# Patient Record
Sex: MALE | Race: WHITE | NOT HISPANIC OR LATINO
[De-identification: names, ages, dates, MRNs, and addresses within clinical notes are randomized per-mention and may not be internally consistent; named-entity substitution may affect disease eponyms.]

---

## 2020-07-29 PROBLEM — Z00.00 ENCOUNTER FOR PREVENTIVE HEALTH EXAMINATION: Status: ACTIVE | Noted: 2020-07-29

## 2023-09-01 ENCOUNTER — APPOINTMENT (OUTPATIENT)
Dept: ORTHOPEDIC SURGERY | Facility: CLINIC | Age: 63
End: 2023-09-01
Payer: COMMERCIAL

## 2023-09-01 PROCEDURE — 99205 OFFICE O/P NEW HI 60 MIN: CPT

## 2023-09-01 PROCEDURE — 72170 X-RAY EXAM OF PELVIS: CPT

## 2023-09-01 PROCEDURE — 72110 X-RAY EXAM L-2 SPINE 4/>VWS: CPT

## 2023-09-01 RX ORDER — NEBIVOLOL HYDROCHLORIDE 20 MG/1
TABLET ORAL
Refills: 0 | Status: ACTIVE | COMMUNITY

## 2023-09-01 RX ORDER — ALLOPURINOL 300 MG/1
300 TABLET ORAL
Refills: 0 | Status: ACTIVE | COMMUNITY

## 2023-09-01 RX ORDER — IBUPROFEN 100 MG/1
TABLET, CHEWABLE ORAL
Refills: 0 | Status: ACTIVE | COMMUNITY

## 2023-09-01 RX ORDER — ATORVASTATIN CALCIUM 40 MG/1
40 TABLET, FILM COATED ORAL
Refills: 0 | Status: ACTIVE | COMMUNITY

## 2023-09-01 NOTE — IMAGING
[de-identified] : LSPINE Inspection: No rash or ecchymosis Palpation: No tenderness to palpation or spasm in bilateral thoracic and lumbar paraspinal musculature, no SI joint tenderness to palpation ROM: Full with no pain Strength: 5/5 bilateral hip flexors, knee extensors, ankle dorsiflexors, EHL, ankle plantarflexors Sensation: Sensation present to light touch bilateral L2-S1 distributions Provocative maneuvers: Negative bilateral straight leg raise

## 2023-09-01 NOTE — ASSESSMENT
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   C/w OTC meds F/up PRN

## 2023-09-01 NOTE — HISTORY OF PRESENT ILLNESS
[Lower back] : lower back [8] : 8 [Dull/Aching] : dull/aching [Localized] : localized [Constant] : constant [Sleep] : sleep [Nothing helps with pain getting better] : Nothing helps with pain getting better [de-identified] : 11/9/21  Lumbar MRI  - report noted in chart.  At T12-L1, the canal and foramen are clear. At L1-2, the canal and foramen are clear. At L2-3, the disc is greatly reduced in height and bulges causing a ventral epidural defect on the thecal sac. The configuration of the disc is relatively focal on the left side of the ventral epidural space which could indicate the presence of a superimposed left-sided herniation. Regardless, the configuration of the facets plus the disc cause high-grade bilateral lateral recess stenosis, left side more than right, and compression of the descending left L3 nerve root as the L3 root traverses this level. The foramen are clear. At L3-4, the disc is slightly reduced in height and bulges. The bulge is asymmetrically larger on the right side and causes a ventral epidural defect on the right side of the thecal sac. Furthermore, the contour of the disc in the right side of the ventral epidural space is slightly focal in configuration which suggests the presence of an underlying small herniation. Regardless, hypertrophic degenerative facet arthritis is present bilaterally and the configuration of the disc plus the configuration of the facet may be compressing the descending right L4 nerve root in the lateral recess as the L4 root traverses this level. The foramen are clear. At L4-5, the height of the disc is relatively well maintained. However, abnormal signal is present in the annulus fibrosis in the midline and just to the right of midline in the ventral epidural space consistent with an annular tear. Associated with the tear is a slight projection of the disc into the ventral epidural space that could represent a small central herniation just to the right of midline. The contour of the disc causes a minor ventral epidural defect on the thecal sac just to the right of midline. The foramen are clear. At L5-S1, the height of the disc is maintained. The canal and foramen are clear. Hypertrophic degenerative facet arthritis on the left side is present. Ind. 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 No radiating pain. Has had 3 ESIs w/ minimal relief 1 day; PT; yoga- none has helped. MRI L Spine completed @ [facility] 2021. Diffuse back pain. Denies N/T in LE. No bb dysfunction. Had an intracept on 03/24/23 with some temporarily relief. Pain exacerbates when bending forward.  [] : no

## 2023-09-26 ENCOUNTER — APPOINTMENT (OUTPATIENT)
Dept: PAIN MANAGEMENT | Facility: CLINIC | Age: 63
End: 2023-09-26
Payer: COMMERCIAL

## 2023-09-26 VITALS — HEIGHT: 71 IN | BODY MASS INDEX: 42.7 KG/M2 | WEIGHT: 305 LBS

## 2023-09-26 DIAGNOSIS — Z86.39 PERSONAL HISTORY OF OTHER ENDOCRINE, NUTRITIONAL AND METABOLIC DISEASE: ICD-10-CM

## 2023-09-26 DIAGNOSIS — Z86.79 PERSONAL HISTORY OF OTHER DISEASES OF THE CIRCULATORY SYSTEM: ICD-10-CM

## 2023-09-26 PROCEDURE — 99214 OFFICE O/P EST MOD 30 MIN: CPT

## 2023-09-27 ENCOUNTER — TRANSCRIPTION ENCOUNTER (OUTPATIENT)
Age: 63
End: 2023-09-27

## 2023-10-02 ENCOUNTER — APPOINTMENT (OUTPATIENT)
Dept: PAIN MANAGEMENT | Facility: CLINIC | Age: 63
End: 2023-10-02
Payer: COMMERCIAL

## 2023-10-02 PROCEDURE — J3490M: CUSTOM

## 2023-10-02 PROCEDURE — 64493 INJ PARAVERT F JNT L/S 1 LEV: CPT | Mod: 50

## 2023-10-02 PROCEDURE — 64494 INJ PARAVERT F JNT L/S 2 LEV: CPT | Mod: 50,59

## 2023-10-10 ENCOUNTER — APPOINTMENT (OUTPATIENT)
Dept: PAIN MANAGEMENT | Facility: CLINIC | Age: 63
End: 2023-10-10
Payer: COMMERCIAL

## 2023-10-10 VITALS — BODY MASS INDEX: 42.7 KG/M2 | WEIGHT: 305 LBS | HEIGHT: 71 IN

## 2023-10-10 PROCEDURE — 99214 OFFICE O/P EST MOD 30 MIN: CPT

## 2023-10-17 ENCOUNTER — APPOINTMENT (OUTPATIENT)
Dept: PAIN MANAGEMENT | Facility: CLINIC | Age: 63
End: 2023-10-17
Payer: COMMERCIAL

## 2023-10-17 PROCEDURE — J3490M: CUSTOM

## 2023-10-17 PROCEDURE — 64494 INJ PARAVERT F JNT L/S 2 LEV: CPT | Mod: 50,59

## 2023-10-17 PROCEDURE — 64493 INJ PARAVERT F JNT L/S 1 LEV: CPT | Mod: 50

## 2023-10-31 ENCOUNTER — APPOINTMENT (OUTPATIENT)
Dept: PAIN MANAGEMENT | Facility: CLINIC | Age: 63
End: 2023-10-31
Payer: COMMERCIAL

## 2023-10-31 VITALS — HEIGHT: 71 IN | BODY MASS INDEX: 42.7 KG/M2 | WEIGHT: 305 LBS

## 2023-10-31 PROCEDURE — 99214 OFFICE O/P EST MOD 30 MIN: CPT

## 2023-11-14 ENCOUNTER — APPOINTMENT (OUTPATIENT)
Dept: PAIN MANAGEMENT | Facility: CLINIC | Age: 63
End: 2023-11-14
Payer: COMMERCIAL

## 2023-11-14 PROCEDURE — J3490M: CUSTOM

## 2023-11-14 PROCEDURE — 64636Z: CUSTOM | Mod: 50,59

## 2023-11-14 PROCEDURE — 64635 DESTROY LUMB/SAC FACET JNT: CPT | Mod: 50

## 2023-11-28 ENCOUNTER — APPOINTMENT (OUTPATIENT)
Dept: PAIN MANAGEMENT | Facility: CLINIC | Age: 63
End: 2023-11-28
Payer: COMMERCIAL

## 2023-11-28 VITALS — HEIGHT: 71 IN | WEIGHT: 305 LBS | BODY MASS INDEX: 42.7 KG/M2

## 2023-11-28 PROCEDURE — 99213 OFFICE O/P EST LOW 20 MIN: CPT

## 2023-12-22 ENCOUNTER — TRANSCRIPTION ENCOUNTER (OUTPATIENT)
Age: 63
End: 2023-12-22

## 2023-12-26 ENCOUNTER — RX RENEWAL (OUTPATIENT)
Age: 63
End: 2023-12-26

## 2023-12-26 RX ORDER — DICLOFENAC SODIUM 75 MG/1
75 TABLET, DELAYED RELEASE ORAL
Qty: 60 | Refills: 0 | Status: ACTIVE | COMMUNITY
Start: 2023-11-28 | End: 1900-01-01

## 2024-03-19 ENCOUNTER — APPOINTMENT (OUTPATIENT)
Dept: PAIN MANAGEMENT | Facility: CLINIC | Age: 64
End: 2024-03-19
Payer: COMMERCIAL

## 2024-03-19 VITALS — WEIGHT: 295 LBS | HEIGHT: 71 IN | BODY MASS INDEX: 41.3 KG/M2

## 2024-03-19 PROCEDURE — 99214 OFFICE O/P EST MOD 30 MIN: CPT

## 2024-03-19 RX ORDER — DICLOFENAC SODIUM 75 MG/1
75 TABLET, DELAYED RELEASE ORAL
Qty: 60 | Refills: 2 | Status: ACTIVE | COMMUNITY
Start: 2023-12-26 | End: 1900-01-01

## 2024-03-19 NOTE — PHYSICAL EXAM
[Extension] : extension [de-identified] : Constitutional; Appears well, no apparent distress Ability to communicate: Normal  Respiratory: non-labored breathing Skin: No rash noted Head: Normocephalic, atraumatic Neck: no visible thyroid enlargement Eyes: Extraocular movements intact Neurologic: Alert and oriented x3 Psychiatric: normal mood, affect and behavior [] : non-antalgic

## 2024-03-19 NOTE — REVIEW OF SYSTEMS
You may receive a survey regarding the care you received during your visit. Your input is valuable to us. We encourage you to complete and return your survey. We hope you will choose us in the future for your healthcare needs. [Negative] : Heme/Lymph

## 2024-03-19 NOTE — DISCUSSION/SUMMARY
[de-identified] : After discussing various treatment options with the patient including but not limited to oral medications, physical therapy, exercise modalities as well as interventional spinal injections, we have decided with the following plan:   - Continue Home exercises, stretching, activity modification, physical therapy, and conservative care. - MRI report and/or images was reviewed and discussed with the patient. - Recommend Bilateral L3,4,5 radiofrequency ablation under fluoroscopic guidance with image. - Patient has lumbosacral axial pain that is consistent with facet joint pathology. Two diagnostic blocks of the medial branch nerves with local anesthetic was performed and has resulted in at least an 80% reduction in pain for the duration of the specific local anesthetic. The pain is not radicular. There is no prior spinal fusion surgery at the level targeted.  The pain has failed to respond to three months of conservative therapy. - The risks, benefits, contents and alternatives to injection were explained in full to the patient.  Risks outlined include but are not limited to infection, sepsis, bleeding, post-dural puncture headache, nerve damage, temporary increase in pain, syncopal episode, failure to resolve symptoms, allergic reaction, symptom recurrence, and elevation of blood sugar in diabetics. Cortisone may cause immunosuppression.  Patient understands the risks.  All questions were answered.  After discussion of options, patient requested an injection.  Information regarding the injection was given to the patient.  Which medications to stop prior to the injection was explained to the patient as well. - Follow up in 1-2 weeks post injection for re-evaluation.   - Recommend Diclofenac 75mg BID PRN. Potential adverse effects including but not limited to bleeding, ulcers, increased risk of hypertension, heart disease, kidney disease and stroke were discussed with the patient.  Medication would allow patient to be more mobile and perform ADL's.  Will continue to monitor patient and attempt to wean as soon as possible. Will use the lowest dosage possible for the shortest possible period of time. Will refill.

## 2024-03-19 NOTE — HISTORY OF PRESENT ILLNESS
[Lower back] : lower back [Mid-back] : mid-back [6] : 6 [Dull/Aching] : dull/aching [Radiating] : radiating [Sharp] : sharp [Meds] : meds [Constant] : constant [] : no [FreeTextEntry1] : b/l  [de-identified] : 2023 [de-identified] : malick Bella [de-identified] : L MRI

## 2024-04-16 ENCOUNTER — APPOINTMENT (OUTPATIENT)
Dept: PAIN MANAGEMENT | Facility: CLINIC | Age: 64
End: 2024-04-16
Payer: COMMERCIAL

## 2024-04-16 PROCEDURE — 64636Z: CUSTOM | Mod: 50,59

## 2024-04-16 PROCEDURE — J3490M: CUSTOM

## 2024-04-16 PROCEDURE — 64635 DESTROY LUMB/SAC FACET JNT: CPT | Mod: 50

## 2024-04-16 NOTE — PROCEDURE
[FreeTextEntry3] : Date of Service: 04/16/2024   Account: [id number]  Patient: AGUSTO BOYKIN   YOB: 1960  Age: 63 year   Surgeon: Kevin Remy D.O.   PREOPERATIVE DIAGNOSIS: Spondylosis of Lumbar Region without Myelopathy or Radiculopathy (M47.816)  POSTOPERATIVE DIAGNOSIS: Spondylosis of Lumbar Region without Myelopathy or Radiculopathy (M47.816)  PROCEDURE:  Right L3, L4, L5 and Left L3, L4, L5 medial branch radiofrequency ablation under fluoroscopic guidance.              Anesthesia: Local with MAC   Risks, benefits and alternatives of the procedure were discussed with the patient after which she agreed to proceed.  Patient was brought into fluoroscopy suite and was placed in prone position with hip support. Back was prepped and draped in a sterile fashion. Anesthesia initiated.   Under AP visualization, the right and left sacral ala was identified and marked. Using a 25-gauge  inch needle the skin and subcutaneous structures at this point were localized with 1% Lidocaine using approximately 3 cc's 1% Lidocaine.  After this, a 20-gauge 100mm [place] radiofrequency needle with a 10mm curved tip was inserted and using depth direction depth technique under constant fluoroscopic visualization, the needle was advanced to the sacral ala until os was contacted.    The camera was then redirected under AP view to visualize the right and left L4 and L5 vertebra. The camera was obliqued to approximately 30 degrees to reveal good Lazaro dog anatomical view. The junction of the superior articulate process and transverse process at the L4 and L5 levels were then identified and marked. Skin and subcutaneous structures were then anesthetized with approximately 3 cc's of 1% Lidocaine at each of these levels. After which a 20-gauge 100mm Leticia radiofrequency needle with a 10mm curved tip then advanced until the junction at the SAP and transverse process was met.  The camera was then directed through the lateral view and under constant fluoroscopic visualization, the needle tips were then advanced and confirmed at the junction of the SAP and transverse process.    The stylette for the most cephalad needle at the L4 level was then removed and a [place] radiofrequency probe was then placed inside the needle. After her pedis' were checked at approximately 300 ohm, 50 hertz sensory stimulation was performed.  Patient experienced concordant pain in his low back at approximately 0.3 volts. The voltage was then increased to 1 volt. The patient reported increased low back pain symptoms without any radiation below the knee.  Stimulation was then changed to 2 hertz and increased slowly by .1 volt increments at approximately 0.5 volts, patient began to experience thumping like reproductive pain in his low back. The voltage was then increased to approximately 2.5 volts. Patient experienced increasing thumping without any sensation below his knee. This exact stimulation was then repeated for the L5 needle as well as sacral ala needle with concordant pain and no radiation below the knee. The 6 levels were then anesthetized with approximately 0.5 cc's of 1% Lidocaine. After which each area was then ablated at 80 degrees centigrade for 60 seconds each. Patient felt no pain reproduction during the ablation procedure.  After each of these levels were ablated and injected approximately 1 cc of 0.25% Bupivacaine plus 1.5mg of betamethasone were then injected before the needles were removed.  Pressure was then applied to the low back. Band-aids were applied.  Patient was brought to the recovery, ambulated on his own after the procedure and reported decreased low back pain.   Anesthesia personnel were present throughout the procedure. Patient was told to apply ice to the low back for 20 minutes on and 20 minutes off for focal symptoms for 24-48 hours. He is to call the office if he had any questions or concerns.    Kevin Remy D.O.

## 2024-05-07 ENCOUNTER — APPOINTMENT (OUTPATIENT)
Dept: PAIN MANAGEMENT | Facility: CLINIC | Age: 64
End: 2024-05-07
Payer: COMMERCIAL

## 2024-05-07 VITALS — WEIGHT: 295 LBS | HEIGHT: 71 IN | BODY MASS INDEX: 41.3 KG/M2

## 2024-05-07 DIAGNOSIS — M51.36 OTHER INTERVERTEBRAL DISC DEGENERATION, LUMBAR REGION: ICD-10-CM

## 2024-05-07 DIAGNOSIS — M54.50 LOW BACK PAIN, UNSPECIFIED: ICD-10-CM

## 2024-05-07 DIAGNOSIS — M47.819 SPONDYLOSIS W/OUT MYELOPATHY OR RADICULOPATHY, SITE UNSPECIFIED: ICD-10-CM

## 2024-05-07 PROCEDURE — 99213 OFFICE O/P EST LOW 20 MIN: CPT

## 2024-05-07 NOTE — DISCUSSION/SUMMARY
[de-identified] : After discussing various treatment options with the patient including but not limited to oral medications, physical therapy, exercise modalities as well as interventional spinal injections, we have decided with the following plan:  - Continue home exercises, stretching, activity modification, physical therapy, and conservative care. - Follow-up as needed. - Recommend continue Diclofenac 75mg BID PRN. Potential adverse effects including but not limited to bleeding, ulcers, increased risk of hypertension, heart disease, kidney disease and stroke were discussed with the patient.  Medication would allow patient to be more mobile and perform ADL's.  Will continue to monitor patient and attempt to wean as soon as possible. Will use the lowest dosage possible for the shortest possible period of time. Will refill.  - Can consider medical marijuana vs acupuncture.

## 2024-05-07 NOTE — PHYSICAL EXAM
[Extension] : extension [de-identified] : Constitutional; Appears well, no apparent distress Ability to communicate: Normal  Respiratory: non-labored breathing Skin: No rash noted Head: Normocephalic, atraumatic Neck: no visible thyroid enlargement Eyes: Extraocular movements intact Neurologic: Alert and oriented x3 Psychiatric: normal mood, affect and behavior [] : non-antalgic

## 2024-07-07 ENCOUNTER — NON-APPOINTMENT (OUTPATIENT)
Age: 64
End: 2024-07-07

## 2024-07-08 ENCOUNTER — APPOINTMENT (OUTPATIENT)
Dept: ORTHOPEDIC SURGERY | Facility: CLINIC | Age: 64
End: 2024-07-08
Payer: COMMERCIAL

## 2024-07-08 VITALS — BODY MASS INDEX: 42.7 KG/M2 | HEIGHT: 71 IN | WEIGHT: 305 LBS

## 2024-07-08 DIAGNOSIS — M51.36 OTHER INTERVERTEBRAL DISC DEGENERATION, LUMBAR REGION: ICD-10-CM

## 2024-07-08 PROCEDURE — 99204 OFFICE O/P NEW MOD 45 MIN: CPT

## 2024-07-25 ENCOUNTER — APPOINTMENT (OUTPATIENT)
Dept: ORTHOPEDIC SURGERY | Facility: CLINIC | Age: 64
End: 2024-07-25
Payer: COMMERCIAL

## 2024-07-25 VITALS
BODY MASS INDEX: 42.7 KG/M2 | DIASTOLIC BLOOD PRESSURE: 82 MMHG | HEART RATE: 80 BPM | WEIGHT: 305 LBS | SYSTOLIC BLOOD PRESSURE: 132 MMHG | HEIGHT: 71 IN

## 2024-07-25 DIAGNOSIS — M51.36 OTHER INTERVERTEBRAL DISC DEGENERATION, LUMBAR REGION: ICD-10-CM

## 2024-07-25 PROCEDURE — 99214 OFFICE O/P EST MOD 30 MIN: CPT

## 2024-07-25 NOTE — DISCUSSION/SUMMARY
[Medication Risks Reviewed] : Medication risks reviewed [Surgical risks reviewed] : Surgical risks reviewed [de-identified] : Lumbar disc degenerative disease. L2-4 modic endplate changes. Discussed all options. Discussed weight loss. Referred to Dr. Aleks Pike to discuss bariatric surgery. He does not weigh [place] 300 pounds and is at risk with an elevated BMI for any type of surgery as well as a breakdown above and below his fusion.  My recommendation would be to see consultation for bariatric procedure to see if he can lose some weight which may help him with his back pain as well as recovery following spinal surgery. Discussed L2-4 fusion Risks of surgery include infection, dural tear, nerve root injury, reherniation, future leg pain, future back pain, retained fragment, hematoma, urinary retention, worsening leg symptoms, foot drop, anesthetic risks, blood transfusion risks, positioning pain, visceral vascular injury, deep vein thrombosis, pulmonary embolus, and death. Somatosensory evoked potentials and EMG monitoring will be used. Surgery will involve lumbar decompression and fusion with or without instrumentation, local auto bone fusion, demineralized bone matrix, and neural monitoring. Patient will need spinal orthosis pre and post operatively. All risks were explained not exclusive to the ones mentioned alternatives were discussed and all questions were answered the patient agrees and understands the above and is in complete agreement with the plan. All options discussed including rest, medicine, home exercise, acupuncture, Chiropractic care, Physical Therapy, Pain management, and last resort surgery. All questions were answered, all alternatives discussed, and the patient is in complete agreement with the treatment plan which the patient contributed to and discussed with me through the shared decision-making process. Follow-up appointment as instructed. Any issues and the patient will call or come in sooner.

## 2024-07-25 NOTE — ADDENDUM
[FreeTextEntry1] : This note was written by Gage Ravi on 07/25/2024 acting as scribe for Dr. Jason Awad M.D.  I, Jason Awad MD, have read and attest that all the information, medical decision making and discharge instructions within are true and accurate.

## 2024-07-25 NOTE — PHYSICAL EXAM
[Normal] : Gait: normal [Harris's Sign] : negative Harris's sign [Pronator Drift] : negative pronator drift [SLR] : negative straight leg raise [de-identified] : 5 out of 5 motor strength, sensation is intact and symmetrical full range of motion flexion extension and rotation, no palpatory tenderness full range of motion of hips knees shoulders and elbows (all four extremities), no atrophy, negative straight leg raise, no pathological reflexes, no swelling, normal ambulation, no apparent distress skin is intact, no palpable lymph nodes, no upper or lower extremity instability, alert and oriented x3 and normal mood. Normal finger-to nose test.  No upper motor neuron findings. Pain with forward flexion lumbar. [de-identified] : I reviewed, interpreted and clinically correlated the following outside imaging studies, 07/16/2024 MRI-LUMBAR SPINE NON CONTRAST   ([facility])  HISTORY: Low back pain and left leg radiculopathy Technique: Multiplanar, multisequence MRI of the lumbar spine was performed without the administration of intravenous contrast . Comparison: Prior examination from November 2021. FINDINGS: The L5-S1 level shows degenerative disc disease without focal disc herniation or central spinal stenosis. Facet arthropathy. The neural foramina patent. The L4-5 level shows degenerative disc disease without focal disc herniation or central spinal stenosis. Facet arthropathy. The neural foramina patent. The L3-4 level shows degenerative disc disease without focal disc herniation or central spinal stenosis. Facet arthropathy. The neural foramina patent. The L2-3 level shows degenerative disc disease without focal disc herniation or central spinal stenosis. Facet arthropathy. The neural foramina patent. The L1-2, T12-L1 levels show no evidence of disc herniation or spinal stenosis. The neural foramina patent. Conus and cauda equina are normal. Paravertebral soft tissues are normal. Modic 1 endplate changes at L2-3 and L3-4. No fracture or dislocation. Mild progression since prior examination IMPRESSION: 1. Degenerative disc disease involving the lumbar spine without evidence of lumbar disc herniation or conus or cauda equina compression. 2. Facet arthropathy involving the lumbar spine without evidence of foraminal stenosis. 3. Modic 1 endplate changes at L2-3 and L3-4.    03/10/2023 LS SPINE XRAY AP AND LATERAL   ([facility])  HISTORY: M54.5 Lower back pain Views: standing AP and lateral There are no acute fractures. Cortical margins are intact. Vertebral body heights are well-maintained. There is moderate diffuse multilevel narrowing of the intervertebral disc spaces. There is moderate L2-3 and mild diffuse osteophyte formation Facet joints are unremarkable. There is no evidence of spondylolysis Vertebral alignment is normal. Sacroiliac joints demonstrate no degenerative changes.. Lumbar lordotic curve is normal. IMPRESSION: Mild to moderate multilevel lumbar degenerative changes.   11/09/2021 MRI-LUMBAR SPINE NON CONTRAST   ([facility])  HISTORY: Lower back pain M54.5 TECHNIQUE: MR imaging of the lumbar spine was performed without contrast on a 3.0 [employer] Ultra High Field Wide Bore MRI unit utilizing the following pulse sequences sagittal T1, T2, STIR, and axial proton density. COMPARISON: No prior studies are available for comparison. FINDINGS: The lowest visualized intervertebral discs represents L5-S1. This is labeled by the iliolumbar ligament articulating with the L5 vertebral body Five lumbar vertebral bodies are assumed. Alignment: Coronal localizer images demonstrate no significant scoliosis. The normal lumbar lordosis is maintained. There is no significant spondylolisthesis. Bones: There is vertebral endplate bone marrow edema anteriorly at L2-3. Bone marrow signal is normal, without evidence of acute fracture or infiltrative process. The vertebral body heights are intact. Intervertebral Discs: Disc desiccation loss disc height at L2-3. Disc desiccation L3-4 on L4-5. The conus medullaris is normal in signal intensity and morphology and terminates at the L1 level. T12-L1: No evidence of disc bulge, protrusion or extrusion. No central canal, subarticular or neural foraminal narrowing is noted. There is no significant facet arthropathy. L1-L2: No evidence of disc bulge, protrusion or extrusion. No central canal, subarticular or neural foraminal narrowing is noted. There is no significant facet arthropathy. L2-L3: Circumferential disc bulge with mild to moderate central canal narrowing and mild bilateral neural foraminal narrowing. Bilateral subarticular stenosis encroaching on the descending L3 nerve roots. L3-L4: Circumferential disc bulge and bilateral facet arthrosis and ligamentum flavum hypertrophy with mild central canal narrowing mild bilateral neural foraminal narrowing. L4-5: Right paracentral protruded disc herniation bilateral facet arthrosis with ligamentum flavum hypertrophy. Right subarticular stenosis encroaching on right descending L5 nerve root.. Indentation of thecal sac without significant central canal narrowing. L5-S1: Broad-based right paracentral protruded disc herniation and bilateral facet arthrosis. Mild bilateral neural foraminal narrowing. The paraspinal muscles are symmetric. IMPRESSION: 1. Multilevel degenerative disc disease in the lumbar spine most prominent at L2-3 and L4-5. 2. L2-L3: Circumferential disc bulge with mild to moderate central canal narrowing and mild bilateral neural foraminal narrowing. Bilateral subarticular stenosis encroaching on the descending L3 nerve roots. 3. L3-L4: Circumferential disc bulge and bilateral facet arthrosis and ligamentum flavum hypertrophy with mild central canal narrowing mild bilateral neural foraminal narrowing. 4. L4-5: Right paracentral protruded disc herniation bilateral facet arthrosis with ligamentum flavum hypertrophy. Right subarticular stenosis encroaching on right descending L5 nerve root. Indentation of thecal sac without significant central canal narrowing. 5. L5-S1: Broad-based right paracentral protruded disc herniation and bilateral facet arthrosis. Mild bilateral neural foraminal narrowing. 6. Modic type I endplate changes at L2-3. Signed by: Iglesia Castano MD Signed Date: 11/10/2021 1:02 PM EST SIGNED BY: Iglesia Castano M.D., Ext. [phone] 11/10/2021 01:02 PM ADDENDUM: A second review was requested by the caring physician, Dr. Donavan Jon. EXAMINATION: MRI of the lumbar spine dated 11/9/2021. Clinical history: 60-year-old male with upper lumbar back pain when sitting or bending over to tie his shoes. Correlative imaging: No prior correlative imaging was available for review. TECHNIQUE: Sagittal and axial MR images of the lumbar spine were acquired on a 1.5 [employer] MR scanner. FINDINGS: A 12 mm Schmorl's node in the superior endplate of L4 is present. Otherwise, no fractures, lytic, or blastic disease is demonstrated. Abnormal signal is present, however, in the endplates adjacent to the L2-3 disc and to a lesser degree at the L5-S1 disc consistent with degenerative reaction (Modic type I, 2). The conus is normal in signal and ends at the level of the T12-L1 interspace. No abnormal intradural filling defects are demonstrated. At T12-L1, the canal and foramen are clear. At L1-2, the canal and foramen are clear. At L2-3, the disc is greatly reduced in height and bulges causing a ventral epidural defect on the thecal sac. The configuration of the disc is relatively focal on the left side of the ventral epidural space which could indicate the presence of a superimposed left-sided herniation. Regardless, the configuration of the facets plus the disc cause high-grade bilateral lateral recess stenosis, left side more than right, and compression of the descending left L3 nerve root as the L3 root traverses this level. The foramen are clear. At L3-4, the disc is slightly reduced in height and bulges. The bulge is asymmetrically larger on the right side and causes a ventral epidural defect on the right side of the thecal sac. Furthermore, the contour of the disc in the right side of the ventral epidural space is slightly focal in configuration which suggests the presence of an underlying small herniation. Regardless, hypertrophic degenerative facet arthritis is present bilaterally and the configuration of the disc plus the configuration of the facet may be compressing the descending right L4 nerve root in the lateral recess as the L4 root traverses this level. The foramen are clear. At L4-5, the height of the disc is relatively well maintained. However, abnormal signal is present in the annulus fibrosis in the midline and just to the right of midline in the ventral epidural space consistent with an annular tear. Associated with the tear is a slight projection of the disc into the ventral epidural space that could represent a small central herniation just to the right of midline. The contour of the disc causes a minor ventral epidural defect on the thecal sac just to the right of midline. The foramen are clear. At L5-S1, the height of the disc is maintained. The canal and foramen are clear. Hypertrophic degenerative facet arthritis on the left side is present. At the time of my review I had the opportunity to discuss this examination with the caring physician. IMPRESSION: Multilevel degenerative disease.

## 2024-07-25 NOTE — HISTORY OF PRESENT ILLNESS
[Stable] : stable [de-identified] : 63 year old male presents for initial evaluation of lower back pain x 3 years.  S/P In[facility] in 2023 with Dr. Artis.  Denies injury. States that the pain is localized to the lower back. Denies numbness/tingling. Denies weakness of the legs.  Bending, getting up from a seated/laying position and laying down aggravates the pain.  Takes diclofenac but no relief.  He last participated with PT in 2021 which did not provide relief. He also has been performing daily lumbar HEP as taught to him by the PT but no relief. Has also tried yoga. S/P ESIs and ablations over the years with Dr. Aguirre, Dr. Artis and Dr. Remy. States that the most recent procedure was in March but did not feel improvement. He states that his back pain is similar to the pain he gets with gout attacks.  Presents today for MRI Lumbar review.  PMHx: HTN, gout He is a .  No fever, chills, sweats, nausea/vomiting. No bowel or bladder dysfunction, no recent weight loss or gain. No night pain. This history is in addition to the intake form that I personally reviewed.

## 2024-07-27 ENCOUNTER — TRANSCRIPTION ENCOUNTER (OUTPATIENT)
Age: 64
End: 2024-07-27

## 2024-10-09 ENCOUNTER — TRANSCRIPTION ENCOUNTER (OUTPATIENT)
Age: 64
End: 2024-10-09

## 2024-10-15 ENCOUNTER — TRANSCRIPTION ENCOUNTER (OUTPATIENT)
Age: 64
End: 2024-10-15

## 2024-11-14 ENCOUNTER — TRANSCRIPTION ENCOUNTER (OUTPATIENT)
Age: 64
End: 2024-11-14

## 2024-11-15 ENCOUNTER — TRANSCRIPTION ENCOUNTER (OUTPATIENT)
Age: 64
End: 2024-11-15